# Patient Record
Sex: FEMALE | Race: AMERICAN INDIAN OR ALASKA NATIVE | NOT HISPANIC OR LATINO | Employment: FULL TIME | URBAN - METROPOLITAN AREA
[De-identification: names, ages, dates, MRNs, and addresses within clinical notes are randomized per-mention and may not be internally consistent; named-entity substitution may affect disease eponyms.]

---

## 2023-02-24 ENCOUNTER — TELEPHONE (OUTPATIENT)
Dept: PSYCHIATRY | Facility: CLINIC | Age: 48
End: 2023-02-24

## 2023-02-24 ENCOUNTER — TRANSFERRED RECORDS (OUTPATIENT)
Dept: HEALTH INFORMATION MANAGEMENT | Facility: CLINIC | Age: 48
End: 2023-02-24

## 2023-02-24 LAB
ALT SERPL-CCNC: 40 U/L (ref 5–33)
AST SERPL-CCNC: 58 U/L (ref 5–32)
CREATININE (EXTERNAL): 0.7 MG/DL (ref 0.5–0.9)
GFR ESTIMATED (EXTERNAL): 103.18 ML/MIN/1.73M2
GLUCOSE (EXTERNAL): 112 MG/DL (ref 74–109)
POTASSIUM (EXTERNAL): 4 MMOL/L (ref 3.5–5.1)
TSH SERPL-ACNC: 0.39 UIU/ML (ref 0.27–4.2)

## 2023-02-25 ENCOUNTER — HOSPITAL ENCOUNTER (INPATIENT)
Facility: HOSPITAL | Age: 48
LOS: 1 days | Discharge: HOME | End: 2023-02-26
Attending: NURSE PRACTITIONER | Admitting: STUDENT IN AN ORGANIZED HEALTH CARE EDUCATION/TRAINING PROGRAM
Payer: COMMERCIAL

## 2023-02-25 DIAGNOSIS — F33.1 MAJOR DEPRESSIVE DISORDER, RECURRENT EPISODE, MODERATE (H): Primary | ICD-10-CM

## 2023-02-25 LAB
FLUAV RNA SPEC QL NAA+PROBE: NEGATIVE
FLUBV RNA RESP QL NAA+PROBE: NEGATIVE
MAGNESIUM SERPL-MCNC: 1.5 MG/DL (ref 1.7–2.3)
PHOSPHATE SERPL-MCNC: 3.1 MG/DL (ref 2.5–4.5)
RSV RNA SPEC NAA+PROBE: NEGATIVE
SARS-COV-2 RNA RESP QL NAA+PROBE: NEGATIVE

## 2023-02-25 PROCEDURE — 36415 COLL VENOUS BLD VENIPUNCTURE: CPT | Performed by: NURSE PRACTITIONER

## 2023-02-25 PROCEDURE — 84100 ASSAY OF PHOSPHORUS: CPT | Performed by: NURSE PRACTITIONER

## 2023-02-25 PROCEDURE — 83735 ASSAY OF MAGNESIUM: CPT | Performed by: NURSE PRACTITIONER

## 2023-02-25 PROCEDURE — HZ2ZZZZ DETOXIFICATION SERVICES FOR SUBSTANCE ABUSE TREATMENT: ICD-10-PCS | Performed by: NURSE PRACTITIONER

## 2023-02-25 PROCEDURE — 87637 SARSCOV2&INF A&B&RSV AMP PRB: CPT | Performed by: NURSE PRACTITIONER

## 2023-02-25 PROCEDURE — 99222 1ST HOSP IP/OBS MODERATE 55: CPT | Performed by: NURSE PRACTITIONER

## 2023-02-25 PROCEDURE — 250N000013 HC RX MED GY IP 250 OP 250 PS 637: Performed by: STUDENT IN AN ORGANIZED HEALTH CARE EDUCATION/TRAINING PROGRAM

## 2023-02-25 PROCEDURE — 99223 1ST HOSP IP/OBS HIGH 75: CPT | Mod: AI | Performed by: STUDENT IN AN ORGANIZED HEALTH CARE EDUCATION/TRAINING PROGRAM

## 2023-02-25 PROCEDURE — 250N000013 HC RX MED GY IP 250 OP 250 PS 637: Performed by: NURSE PRACTITIONER

## 2023-02-25 PROCEDURE — 124N000001 HC R&B MH

## 2023-02-25 RX ORDER — ACETAMINOPHEN 325 MG/1
650 TABLET ORAL EVERY 4 HOURS PRN
Status: DISCONTINUED | OUTPATIENT
Start: 2023-02-25 | End: 2023-02-26 | Stop reason: HOSPADM

## 2023-02-25 RX ORDER — IBUPROFEN 600 MG/1
600 TABLET, FILM COATED ORAL 4 TIMES DAILY PRN
Status: DISCONTINUED | OUTPATIENT
Start: 2023-02-25 | End: 2023-02-26 | Stop reason: HOSPADM

## 2023-02-25 RX ORDER — CITALOPRAM HYDROBROMIDE 10 MG/1
10 TABLET ORAL DAILY
Status: ON HOLD | COMMUNITY
End: 2023-02-26

## 2023-02-25 RX ORDER — GABAPENTIN 600 MG/1
1200 TABLET ORAL ONCE
Status: COMPLETED | OUTPATIENT
Start: 2023-02-25 | End: 2023-02-25

## 2023-02-25 RX ORDER — GABAPENTIN 300 MG/1
300 CAPSULE ORAL EVERY 8 HOURS
Status: DISCONTINUED | OUTPATIENT
Start: 2023-03-02 | End: 2023-02-25

## 2023-02-25 RX ORDER — GABAPENTIN 300 MG/1
600 CAPSULE ORAL EVERY 8 HOURS
Status: DISCONTINUED | OUTPATIENT
Start: 2023-02-28 | End: 2023-02-25

## 2023-02-25 RX ORDER — CETIRIZINE HYDROCHLORIDE 10 MG/1
10 TABLET ORAL DAILY
COMMUNITY

## 2023-02-25 RX ORDER — CHLORHEXIDINE GLUCONATE 4 %
1 LIQUID (ML) TOPICAL
Status: ON HOLD | COMMUNITY
End: 2023-02-26

## 2023-02-25 RX ORDER — CALCIUM CARBONATE/VITAMIN D3 600 MG-10
1 TABLET ORAL 2 TIMES DAILY
Status: DISCONTINUED | OUTPATIENT
Start: 2023-02-25 | End: 2023-02-26 | Stop reason: HOSPADM

## 2023-02-25 RX ORDER — FLUMAZENIL 0.1 MG/ML
0.2 INJECTION, SOLUTION INTRAVENOUS
Status: DISCONTINUED | OUTPATIENT
Start: 2023-02-25 | End: 2023-02-26 | Stop reason: HOSPADM

## 2023-02-25 RX ORDER — OMEGA-3 FATTY ACIDS/FISH OIL 300-1000MG
600 CAPSULE ORAL 4 TIMES DAILY PRN
Status: ON HOLD | COMMUNITY
End: 2023-02-26

## 2023-02-25 RX ORDER — MAGNESIUM AMINO ACID CHELATE 27 MG
250 TABLET ORAL AT BEDTIME
Status: DISCONTINUED | OUTPATIENT
Start: 2023-02-25 | End: 2023-02-26 | Stop reason: HOSPADM

## 2023-02-25 RX ORDER — LANOLIN ALCOHOL/MO/W.PET/CERES
3 CREAM (GRAM) TOPICAL
Status: DISCONTINUED | OUTPATIENT
Start: 2023-02-25 | End: 2023-02-25

## 2023-02-25 RX ORDER — FAMOTIDINE 20 MG/1
20 TABLET, FILM COATED ORAL DAILY
COMMUNITY

## 2023-02-25 RX ORDER — MULTIPLE VITAMINS W/ MINERALS TAB 9MG-400MCG
1 TAB ORAL DAILY
Status: DISCONTINUED | OUTPATIENT
Start: 2023-02-25 | End: 2023-02-26 | Stop reason: HOSPADM

## 2023-02-25 RX ORDER — HYDROXYZINE HYDROCHLORIDE 25 MG/1
25 TABLET, FILM COATED ORAL EVERY 4 HOURS PRN
Status: DISCONTINUED | OUTPATIENT
Start: 2023-02-25 | End: 2023-02-26 | Stop reason: HOSPADM

## 2023-02-25 RX ORDER — CALCIUM CARBONATE/VITAMIN D3 600 MG-10
500 TABLET ORAL DAILY
Status: DISCONTINUED | OUTPATIENT
Start: 2023-02-25 | End: 2023-02-26 | Stop reason: HOSPADM

## 2023-02-25 RX ORDER — CITALOPRAM HYDROBROMIDE 20 MG/1
20 TABLET ORAL DAILY
Status: DISCONTINUED | OUTPATIENT
Start: 2023-02-25 | End: 2023-02-26 | Stop reason: HOSPADM

## 2023-02-25 RX ORDER — PYRIDOXINE HCL (VITAMIN B6) 25 MG
50 TABLET ORAL DAILY
Status: DISCONTINUED | OUTPATIENT
Start: 2023-02-25 | End: 2023-02-26 | Stop reason: HOSPADM

## 2023-02-25 RX ORDER — GABAPENTIN 100 MG/1
100 CAPSULE ORAL EVERY 8 HOURS
Status: DISCONTINUED | OUTPATIENT
Start: 2023-03-04 | End: 2023-02-25

## 2023-02-25 RX ORDER — HALOPERIDOL 5 MG/ML
2.5-5 INJECTION INTRAMUSCULAR EVERY 6 HOURS PRN
Status: DISCONTINUED | OUTPATIENT
Start: 2023-02-25 | End: 2023-02-26 | Stop reason: HOSPADM

## 2023-02-25 RX ORDER — OLANZAPINE 10 MG/2ML
10 INJECTION, POWDER, FOR SOLUTION INTRAMUSCULAR 3 TIMES DAILY PRN
Status: DISCONTINUED | OUTPATIENT
Start: 2023-02-25 | End: 2023-02-26 | Stop reason: HOSPADM

## 2023-02-25 RX ORDER — VITAMIN B COMPLEX
25 TABLET ORAL DAILY
Status: DISCONTINUED | OUTPATIENT
Start: 2023-02-25 | End: 2023-02-26 | Stop reason: HOSPADM

## 2023-02-25 RX ORDER — LISINOPRIL 30 MG/1
30 TABLET ORAL DAILY
COMMUNITY

## 2023-02-25 RX ORDER — MAGNESIUM HYDROXIDE/ALUMINUM HYDROXICE/SIMETHICONE 120; 1200; 1200 MG/30ML; MG/30ML; MG/30ML
30 SUSPENSION ORAL EVERY 4 HOURS PRN
Status: DISCONTINUED | OUTPATIENT
Start: 2023-02-25 | End: 2023-02-26 | Stop reason: HOSPADM

## 2023-02-25 RX ORDER — GABAPENTIN 300 MG/1
900 CAPSULE ORAL EVERY 8 HOURS
Status: DISCONTINUED | OUTPATIENT
Start: 2023-02-25 | End: 2023-02-25

## 2023-02-25 RX ORDER — OLANZAPINE 5 MG/1
5-10 TABLET, ORALLY DISINTEGRATING ORAL EVERY 6 HOURS PRN
Status: DISCONTINUED | OUTPATIENT
Start: 2023-02-25 | End: 2023-02-26 | Stop reason: HOSPADM

## 2023-02-25 RX ORDER — FLUTICASONE PROPIONATE 50 MCG
2 SPRAY, SUSPENSION (ML) NASAL DAILY
COMMUNITY

## 2023-02-25 RX ORDER — CETIRIZINE HYDROCHLORIDE 10 MG/1
10 TABLET ORAL DAILY
Status: DISCONTINUED | OUTPATIENT
Start: 2023-02-25 | End: 2023-02-26 | Stop reason: HOSPADM

## 2023-02-25 RX ORDER — HYDROXYZINE HYDROCHLORIDE 25 MG/1
25 TABLET, FILM COATED ORAL 4 TIMES DAILY PRN
COMMUNITY
Start: 2023-01-01

## 2023-02-25 RX ORDER — FAMOTIDINE 20 MG/1
20 TABLET, FILM COATED ORAL DAILY
Status: DISCONTINUED | OUTPATIENT
Start: 2023-02-25 | End: 2023-02-26 | Stop reason: HOSPADM

## 2023-02-25 RX ORDER — LORAZEPAM 2 MG/ML
1-2 INJECTION INTRAMUSCULAR EVERY 30 MIN PRN
Status: DISCONTINUED | OUTPATIENT
Start: 2023-02-25 | End: 2023-02-26 | Stop reason: HOSPADM

## 2023-02-25 RX ORDER — NALTREXONE HYDROCHLORIDE 50 MG/1
50 TABLET, FILM COATED ORAL DAILY
Status: ON HOLD | COMMUNITY
End: 2023-02-25

## 2023-02-25 RX ORDER — LORAZEPAM 1 MG/1
1-2 TABLET ORAL EVERY 30 MIN PRN
Status: DISCONTINUED | OUTPATIENT
Start: 2023-02-25 | End: 2023-02-26 | Stop reason: HOSPADM

## 2023-02-25 RX ORDER — MONTELUKAST SODIUM 10 MG/1
10 TABLET ORAL AT BEDTIME
Status: ON HOLD | COMMUNITY
End: 2023-02-26

## 2023-02-25 RX ORDER — TACROLIMUS 1 MG/G
OINTMENT TOPICAL 2 TIMES DAILY
Status: DISCONTINUED | OUTPATIENT
Start: 2023-02-25 | End: 2023-02-26 | Stop reason: HOSPADM

## 2023-02-25 RX ORDER — ALBUTEROL SULFATE 90 UG/1
1 AEROSOL, METERED RESPIRATORY (INHALATION) EVERY 4 HOURS PRN
Status: ON HOLD | COMMUNITY
End: 2023-02-25

## 2023-02-25 RX ORDER — VITAMIN B COMPLEX
25 TABLET ORAL DAILY
COMMUNITY

## 2023-02-25 RX ORDER — TACROLIMUS 1 MG/G
1 OINTMENT TOPICAL 2 TIMES DAILY
COMMUNITY
Start: 2022-09-15

## 2023-02-25 RX ORDER — FLUTICASONE PROPIONATE 50 MCG
2 SPRAY, SUSPENSION (ML) NASAL DAILY
Status: DISCONTINUED | OUTPATIENT
Start: 2023-02-25 | End: 2023-02-26 | Stop reason: HOSPADM

## 2023-02-25 RX ORDER — CLONIDINE HYDROCHLORIDE 0.1 MG/1
0.1 TABLET ORAL EVERY 8 HOURS
Status: DISCONTINUED | OUTPATIENT
Start: 2023-02-25 | End: 2023-02-25

## 2023-02-25 RX ORDER — CALCIUM CARBONATE/VITAMIN D3 600 MG-10
1 TABLET ORAL 2 TIMES DAILY
COMMUNITY

## 2023-02-25 RX ORDER — OLANZAPINE 10 MG/1
10 TABLET ORAL 3 TIMES DAILY PRN
Status: DISCONTINUED | OUTPATIENT
Start: 2023-02-25 | End: 2023-02-26 | Stop reason: HOSPADM

## 2023-02-25 RX ORDER — AMOXICILLIN 250 MG
1 CAPSULE ORAL 2 TIMES DAILY PRN
Status: DISCONTINUED | OUTPATIENT
Start: 2023-02-25 | End: 2023-02-26 | Stop reason: HOSPADM

## 2023-02-25 RX ORDER — FOLIC ACID 1 MG/1
1 TABLET ORAL DAILY
Status: DISCONTINUED | OUTPATIENT
Start: 2023-02-25 | End: 2023-02-26 | Stop reason: HOSPADM

## 2023-02-25 RX ADMIN — Medication 25 MCG: at 11:27

## 2023-02-25 RX ADMIN — Medication 100 MG: at 09:05

## 2023-02-25 RX ADMIN — MULTIPLE VITAMINS W/ MINERALS TAB 1 TABLET: TAB at 09:05

## 2023-02-25 RX ADMIN — FLUTICASONE PROPIONATE 2 SPRAY: 50 SPRAY, METERED NASAL at 11:26

## 2023-02-25 RX ADMIN — FAMOTIDINE 20 MG: 20 TABLET, FILM COATED ORAL at 11:27

## 2023-02-25 RX ADMIN — CLONIDINE HYDROCHLORIDE 0.1 MG: 0.1 TABLET ORAL at 09:05

## 2023-02-25 RX ADMIN — CLONIDINE HYDROCHLORIDE 0.1 MG: 0.1 TABLET ORAL at 00:46

## 2023-02-25 RX ADMIN — CETIRIZINE HYDROCHLORIDE 10 MG: 10 TABLET, FILM COATED ORAL at 11:27

## 2023-02-25 RX ADMIN — CITALOPRAM HYDROBROMIDE 20 MG: 20 TABLET ORAL at 11:27

## 2023-02-25 RX ADMIN — TACROLIMUS: 1 OINTMENT TOPICAL at 11:38

## 2023-02-25 RX ADMIN — CALCIUM CARBONATE 600 MG (1,500 MG)-VITAMIN D3 400 UNIT TABLET 1 TABLET: at 11:27

## 2023-02-25 RX ADMIN — Medication 250 MG: at 20:54

## 2023-02-25 RX ADMIN — CALCIUM CARBONATE 600 MG (1,500 MG)-VITAMIN D3 400 UNIT TABLET 1 TABLET: at 20:54

## 2023-02-25 RX ADMIN — HYDROXYZINE HYDROCHLORIDE 25 MG: 25 TABLET, FILM COATED ORAL at 20:53

## 2023-02-25 RX ADMIN — Medication 50 MG: at 11:26

## 2023-02-25 RX ADMIN — Medication 5 MG: at 00:46

## 2023-02-25 RX ADMIN — FOLIC ACID 1 MG: 1 TABLET ORAL at 09:05

## 2023-02-25 RX ADMIN — GABAPENTIN 1200 MG: 600 TABLET, FILM COATED ORAL at 00:46

## 2023-02-25 RX ADMIN — ACETAMINOPHEN 650 MG: 325 TABLET ORAL at 09:16

## 2023-02-25 RX ADMIN — Medication 500 MCG: at 11:27

## 2023-02-25 RX ADMIN — HYDROXYZINE HYDROCHLORIDE 25 MG: 25 TABLET, FILM COATED ORAL at 09:16

## 2023-02-25 RX ADMIN — GABAPENTIN 900 MG: 300 CAPSULE ORAL at 09:04

## 2023-02-25 RX ADMIN — Medication 5 MG: at 20:54

## 2023-02-25 ASSESSMENT — ACTIVITIES OF DAILY LIVING (ADL)
ADLS_ACUITY_SCORE: 28
DRESSING/BATHING_DIFFICULTY: NO
ADLS_ACUITY_SCORE: 28
HYGIENE/GROOMING: INDEPENDENT
WEAR_GLASSES_OR_BLIND: NO
CONCENTRATING,_REMEMBERING_OR_MAKING_DECISIONS_DIFFICULTY: NO
TOILETING_ISSUES: NO
FALL_HISTORY_WITHIN_LAST_SIX_MONTHS: NO
ADLS_ACUITY_SCORE: 28
LAUNDRY: UNABLE TO COMPLETE
WALKING_OR_CLIMBING_STAIRS_DIFFICULTY: NO
CHANGE_IN_FUNCTIONAL_STATUS_SINCE_ONSET_OF_CURRENT_ILLNESS/INJURY: NO
DRESS: SCRUBS (BEHAVIORAL HEALTH);INDEPENDENT
ADLS_ACUITY_SCORE: 28
ADLS_ACUITY_SCORE: 28
ORAL_HYGIENE: INDEPENDENT
DIFFICULTY_EATING/SWALLOWING: NO
ADLS_ACUITY_SCORE: 45
DOING_ERRANDS_INDEPENDENTLY_DIFFICULTY: NO
ADLS_ACUITY_SCORE: 28
ADLS_ACUITY_SCORE: 28

## 2023-02-25 NOTE — PLAN OF CARE
Problem: Adult Behavioral Health Plan of Care  Goal: Patient-Specific Goal (Individualization)  Description: Pt will follow recommendations of treatment team.  Pt will be compliant with medications.  Pt will attend 50 % of groups.  Pt will sleep 6-8 hours each night.  Outcome: Progressing   pt in her room at the start of the shift. Pt CIWA scored a 7 in the morning, pt states mostly due to anxiety. Pt given hydroxyzine 25mg at 0813. Pt complained of a sore throat, Covid swab done. Pt slept most of the shift. Pt rated her anxiety at 8/10, pain at 5/10. Pt denied SI, HI, hallucinations.   Problem: Suicide Risk  Goal: Absence of Self-Harm  Description: Pt will remain free from self harm.  Pt will verbalize 2-3 coping skills.   Outcome: Progressing   Goal Outcome Evaluation:    Plan of Care Reviewed With: patient          Face to face end of shift report communicated to evening shift RN. Reported that pt is a risk for suicide and is on the CIWA.     Jessica Cuadra, RN  2/25/2023  2:43 PM

## 2023-02-25 NOTE — PROGRESS NOTES
02/25/23 0047   Patient Belongings   Patient Belongings locker;sent to security per site process   Patient Belongings Put in Hospital Secure Location (Security or Locker, etc.) cash/credit card;clothing;shoes;wallet;cell phone/electronics   Belongings Search Yes   Clothing Search Yes   Comment pair of scrub socks, water canteen, phone  w/ plug, black bra, pink underwear, 2x black tanktops, jeans pants, grey sweater, black beanie, 1x pink sock, vape pen, sunglasses.     List items sent to safe: Airpods, black samsung phone w/ cracks, flower phone case wallet inside consisting of a simplicity card, VISA debit card, [place] ID Card, [place] Drivers Licence.    All other belongings put in assigned cubby in belongings room.       I have reviewed my belongings list on admission and verify that it is correct.     Patient signature_______________________________    Second staff witness (if patient unable to sign) ______________________________       I have received all my belongings at discharge.    Patient signature________________________________    [place]  2/25/2023  12:50 AM

## 2023-02-25 NOTE — H&P
"[facility] PSYCHIATRY   HISTORY AND PHYSICAL     ADMISSION DATA     Travis Arthur MRN# [id number]   Age: 47 year old YOB: 1975     Date of Admission: 2023  Primary Physician: No Ref-Primary, Physician        CHIEF COMPLAINT   \"SI.\"       HISTORY OF PRESENT ILLNESS     Per Nursing:    Pt has been drinking whiskey and fireball daily. Pt admits to history of alcohol withdrawal, denies any seizure hx. Pt brought to ER by her mother due to increased suicidal thoughts and alcohol use. Report indicated pt did try to jump out of the vehicle on the way to the ER. Pt denies SI at this time. Pt endorses wanting to get into inpatient CD treatment. Pt says she lives alone in an apartment. Pt says she has been out of work the last couple of months.     Per Patient:    The patient notes onset of depression and anxiety around December. Records shows she has a prior history of depression prior to this. She notes that she thinks her drinking might be related to this. She describes depressed, anhedonia, hard to work, some change in sleeping, and random suicidal ideation, but not considerable or with a plan. She notes that she started Celexa which is helping a little bit. She notes that she has some social anxiety that has been going on since her depression worsened.    The patient notes that she originally wanted to go to the hospital for help and then she changed her mind and tried to exit the car. She notes that she did not try to end her life. She notes that she was scared about going to the hospital.    The patient notes that she has been under considerable stress at her job. She notes that her work calls her \"the  lady.\" She notes that she has had to deal with considerable loss because of this job. She notes that her job is stressful also. She denies symptoms consistent with PTSD.    The patient notes that her last drink was yesterday. She denies any symptoms of withdrawal.    The patient denies " any headache, confusion, change in vision, chest pain, SOB, abdominal pain, diarrhea, or constipation. No medical concerns today.    She consents to increasing Celexa after discussing B/R/SE, including headache, GI side effects, and sexual side effects.        PSYCHIATRIC HISTORY     No prior hospitalizations. New onset of depression/anxiety in December per her description but report of depression earlier. History of SI, being passive and transient. No history of suicide attempts. Primary care is managing psychotropics. Therapist is Natalia Hadley through [facility]. Medication trials include Celexa (current) and Lexapro (didn't take consistently and felt weird on it). Started Celexa on January 4th. She notes that it is helping some.       SUBSTANCE USE HISTORY   History   Drug Use Not on file       Social History    Substance and Sexual Activity      Alcohol use: Not on file      History   Smoking Status     Not on file   Smokeless Tobacco     Not on file     EtOH: Daily drinking since Sunday - a pint or more of Whisky. Engages in binge drinking with periods of not drinking. Longest period of not drinking being 1-2 months. History of WD in the past. No complicated WD history. Started drinking consistently during COVID.  Tobacco: Daily use of 5 cigarettes and then vaping some a day. Many years of use. She feels like it would be hard to quit.    No history of CD treatment.       SOCIAL HISTORY   Social History     Socioeconomic History     Marital status: Single     Spouse name: Not on file     Number of children: Not on file     Years of education: Not on file     Highest education level: Not on file   Occupational History     Not on file   Tobacco Use     Smoking status: Not on file     Smokeless tobacco: Not on file   Substance and Sexual Activity     Alcohol use: Not on file     Drug use: Not on file     Sexual activity: Not on file   Other Topics Concern     Not on file   Social History  Narrative     Not on file     Social Determinants of Health     Financial Resource Strain: Not on file   Food Insecurity: Not on file   Transportation Needs: Not on file   Physical Activity: Not on file   Stress: Not on file   Social Connections: Not on file   Intimate Partner Violence: Not on file   Housing Stability: Not on file     Lives in [place]. Single. Lives alone in an apartment. No history of being . No children. Unemployed for a couple of month due to a leave of absence due to medical reasons, namely stress, depression, and anxiety. Is a  through [facility]. Financial stressors. Family in [place].       FAMILY HISTORY   No family history on file.     Possible alcoholism in parents.     PAST MEDICAL HISTORY   No past medical history on file.    No past surgical history on file.    Seasonal allergies     MEDICATIONS   Prior to Admission medications    Medication Sig Start Date End Date Taking? Authorizing Provider   calcium carbonate-vitamin D (CALTRATE) 600-10 MG-MCG per tablet Take 1 tablet by mouth 2 times daily   Yes Reported, Patient   famotidine (PEPCID) 20 MG tablet Take 20 mg by mouth daily   Yes Reported, Patient   lisinopril (ZESTRIL) 30 MG tablet Take 30 mg by mouth daily   Yes Reported, Patient   tacrolimus (PROTOPIC) 0.1 % external ointment Apply 1 Application. topically 2 times daily 9/15/22  Yes Reported, Patient   Vitamin D3 (CHOLECALCIFEROL) 25 mcg (1000 units) tablet Take 25 mcg by mouth daily   Yes Reported, Patient   cetirizine (ZYRTEC) 10 MG tablet Take 10 mg by mouth daily    Reported, Patient   citalopram (CELEXA) 10 MG tablet Take 10 mg by mouth daily    Reported, Patient   fluticasone (FLONASE) 50 MCG/ACT nasal spray Spray 2 sprays in nostril daily    Reported, Patient   hydrOXYzine (ATARAX) 25 MG tablet Take 25 mg by mouth 4 times daily as needed 1/1/23   Reported, Patient   ibuprofen (ADVIL/MOTRIN) 200 MG capsule Take 600 mg by mouth 4 times daily as needed  "   Reported, Patient   Melatonin 12 MG TABS Take 1 tablet by mouth nightly as needed    Reported, Patient   montelukast (SINGULAIR) 10 MG tablet Take 10 mg by mouth At Bedtime    Reported, Patient   vitamin B-12 (CYANOCOBALAMIN) 500 MCG tablet Take 500 mcg by mouth daily    Reported, Patient        PHYSICAL EXAM/ROS     General: Awake and alert, NAD  HEENT: EOMI, no scleral icterus, no injection of conjunctivae, moist mucus membranes  Respiratory: Breathing comfortably   Extremities: No cyanosis, clubbing, or edema   Skin: No gross rash, no bruising  Neuro: CN II-XII intact, no focal deficits        LABS   No results found for this or any previous visit (from the past 24 hour(s)).      MENTAL STATUS EXAM   Vitals: /63 (BP Location: Right arm)   Pulse 73   Temp (!) 95.7  F (35.4  C) (Temporal)   Resp 20   Ht 1.702 m (5' 7\")   Wt 93.3 kg (205 lb 11.2 oz)   SpO2 98%   BMI 32.22 kg/m      Appearance: Alert, oriented, dressed in hospital scrubs  Attitude: Cooperative   Eye Contact: Fair  Mood: \"Down\"  Affect: Restricted range of affect, mood congruent  Speech: Normal range. Normal rhythm   Psychomotor Behavior: No tremor, rigidity, akathisia, or psychomotor retardation    Thought Process: Logical, goal directed   Associations: No loose associations   Thought Content: Denies SI. No SIB. Denies AVH. No evidence of delusional thought  Insight: Good  Judgment: Fair  Oriented to: Person, place, and time  Attention Span and Concentration: Intact  Recent and Remote Memory: Intact  Language: English with appropriate syntax and vocabulary  Fund of Knowledge: Average  Muscle Strength and Tone: Grossly normal  Gait and Station: Grossly normal       ASSESSMENT     This is a 47 year old female with a PMH of MDD who presents with SI and worsening depression and anxiety occurring in the context of work stressors and increased substance use. This is the patients first hospitalization with her having a history of depression " in the past but not as severe as this episode. Suspect large contribution from substance use. She would benefit from brief hospitalization for crisis stabilization.    In terms of treatment, increasing Celexa to further address depression and anxiety. In addition, recommend outpatient CD treatment with patient already in the process of arranging.       DIAGNOSIS     1. Major depressive disorder, recurrent, moderate in severity with anxious distress  2. Alcohol use disorder, severe  3. Suicidal ideation        PLAN     Location: Unit 5  Legal Status: Orders Placed This Encounter      Emergency Hospitalization Hold (72 Hr Hold)    Safety Assessment:    Behavioral Orders   Procedures     Code 1 - Restrict to Unit     Routine Programming     As clinically indicated     Status 15     Every 15 minutes.      PTA psychotropic medications held:     -None    PTA psychotropic medications continued/changed:     - Celexa 10 mg daily increased to 20 mg daily   - Hydroxyzine prn   - Melatonin prn reduced from 12 to 5 mg prn     New psychotropic medications initiated:     -Magnesium gluconate 250 mg at bedtime for sleep and anxiety  -Standard unit prn agents, including Zyprexa prn agitation    Programming: Patient will be treated in a therapeutic milieu with appropriate individual and group therapies. Education will be provided on diagnoses, medications, and treatments.     Medical diagnoses:  Per medicine    #. RA  - Continue home regimen.  - Last injection of Cimzia every 4 weeks 6 weeks ago. Patient to arrange outpatient    #. Allergies  - Did not resume Singulair due to patient preference    #. Alcohol withdrawal  - [facility] protocol  - Ativan prn  - Thiamine 100 mg daily   - Multivitamin daily  - Folic acid 1 mg daily     Consult: None  Tests: None    Anticipated LOS: 3-5 days   Disposition: Home with outpatient services (CD treatment). Already has medication management and therapy.    Justification for hospitalization: reasons  for hospitalization include potential safety risk to self or others within the last week, decreased functioning in outpatient setting and in the setting of no outpatient management, need for highly structured inpatient management for stabilization of psychiatric symptoms, need for psychiatric medication initiation and stabilization.       ATTESTATION      Dr. Raúl Chicas  Psychiatrist     VIDEO VISIT    Patient has given verbal consent for video visit?: Yes     Video- Visit Details  Type of service:  video visit for mental health treatment.  Time of service:    Date:  02/25/2023    Video Start Time: 930 AM      Video End Time: 1025AM    Reason for video visit: COVID-19 [place]  Originating Site (patient location):  [facility]  Distant Site (provider location):  Remote location  Mode of Communication:  Video Conference via [employer]

## 2023-02-25 NOTE — TELEPHONE ENCOUNTER
S: Outside Facility [facility] [place] [place], Charge GUERITA Goel calling at [phone].  47 year old/Female presenting with Alcohol Intoxification and Suicidal Ideation    B: Pt arrived via mother. Pt presents with suicidal ideation and alcohol intoxication.  Pt affect in ED: Intoxicated  Pt Dx: Major Depressive Disorder  Previous IPMH hx? No  Pt endorses SI. Pt denies SIB.   Pt denies HI. Pt denies hallucinations.   Hx of suicide attempt? No  Hx of aggression, or current concerns for aggression this visit? No  Pt is prescribed medication. Pt is not medication compliant  Pt endorses OP services: [facility]  CD concerns: Etoh; positive for Methadone  Acute medical concerns: n/a  Does Pt present with any of the following: assistive devices, insulin pump, J/G tube, catheter, CPAP, continuous IV, continuous O2, bariatric needs, ADA needs? No  Is Pt their own guardian? Yes:   Pt is ambulatory  Pt is  able to perform ADLs independently    A: Pt meets criteria for review for IP admission. Patient on a 72-hour hold.   COVID: Negative  Utox: Positive for THC and Methadone  CMP: Abnormalities: AST 58; ALT 40  CBC: WNL  HCG: Negative    R: Patient accepted for behavioral bed placement: Yes        Accepted by Provider April Cid    Admission to Inpatient Level of Care is indicated due to:     1. Patient risk of severity of behavioral health disorder is appropriate to proposed level of care as indicated by:   a. Imminent risk of harm to self Yes describe   b. Imminent risk of harm to others No describe   And/or  Behavioral health disorder is present and appropriate for inpatient care with both of the following:   a.  Severe psychiatric, behavioral or other comorbid conditions: Yes describe   b.  Severe dysfunction in daily living is present: Yes describe recent job loss r/t ETOH  2.  Inpatient mental health services are necessary to meet patient needs based on:   a. Specific condition related to admission diagnosis is  present and will likely improve with treatment at an inpatient level of care: Yes  b. Specific condition related to admission diagnosis will likely deteriorate in the absence of treatment at an inpatient level of care: Yes    3.  Situation and expectations are appropriate for inpatient care, as indicated by  one of the following:     A. Patient is unwilling to participate in treatment voluntarily and requires treatment. Yes   B. Is voluntary treatment at lower level of care feasible No  C. Around the clock medical and nursing care is for symptoms is required: Yes  D. Patient management at lower level of care is not appropriate and  biopsychosocial stressors may be contributing to clinical presentation. Yes

## 2023-02-25 NOTE — PLAN OF CARE
Face to face shift report received from Rosey RN. Rounding completed, pt observed laying in bed at the start of the shift.    Patient withdrawn to room sleeping majority of the evening. Alert and making needs known. Ate 100% of dinner. Pleasant during conversation with this writer. Compliant with scheduled medications and nursing assessment. Reports anxiety and received hydroxyzine 25 mg at 2053 with other medications. Denies depression, hallucinations, SI/HI, and pain. Patient states she hasn't been feeling well and reports minimal body aches. Covid swab resulted negative. Requested and received melatonin 5 mg at 2053. Scored 0 on the CIWA at 1800 while sleeping appearing comfortable. Scored a 3 at 2200 due to anxiety.     Problem: Adult Behavioral Health Plan of Care  Goal: Patient-Specific Goal (Individualization)  Description: Pt will follow recommendations of treatment team.  Pt will be compliant with medications.  Pt will attend 50 % of groups.  Pt will sleep 6-8 hours each night.  Outcome: Progressing     Problem: Suicide Risk  Goal: Absence of Self-Harm  Description: Pt will remain free from self harm.  Pt will verbalize 2-3 coping skills.   Outcome: Progressing    Face to face report will be communicated to oncoming RN.    Shalini Gomez RN  2/25/2023

## 2023-02-25 NOTE — PLAN OF CARE
"  Problem: Adult Behavioral Health Plan of Care  Goal: Patient-Specific Goal (Individualization)  Description: Pt will follow recommendations of treatment team.  Pt will be compliant with medications.  Pt will attend 50 % of groups.  Pt will sleep 6-8 hours each night.  Outcome: Progressing     Problem: Suicide Risk  Goal: Absence of Self-Harm  Description: Pt will remain free from self harm.  Pt will verbalize 2-3 coping skills.   Outcome: Progressing     ADMISSION NOTE    Reason for admission ETOH abuse and SI.  Safety concerns none noted.  Risk for or history of violence none noted.  Skin- multiple tattoos, no areas of concern.     Patient arrived on unit from [facility] accompanied by transport and security on 2/25/2023  00:08 AM.   Status on arrival: Pleasant and cooperative  /97 (BP Location: Right arm)   Pulse 98   Temp 98.6  F (37  C) (Temporal)   Resp 18   Ht 1.702 m (5' 7\")   Wt 93.3 kg (205 lb 11.2 oz)   SpO2 97%   BMI 32.22 kg/m    Patient given tour of unit and Welcome to  unit papers given to patient, wanding completed, belongings inventoried, and admission assessment completed.   Patient's legal status on arrival is 72 hour hold. Appropriate legal rights discussed with and copy given to patient. Patient Bill of Rights discussed with and copy given to patient.   Patient denies SI, HI, and thoughts of self harm and contracts for safety while on unit.      Pt has been drinking whiskey and fireball daily. Pt admits to history of alcohol withdrawal, denies any seizure hx. Pt brought to ER by her mother due to increased suicidal thoughts and alcohol use. Report indicated pt did try to jump out of the vehicle on the way to the ER. Pt denies SI at this time. Pt endorses wanting to get into inpatient CD treatment. Pt says she lives alone in an apartment. Pt says she has been out of work the last couple of months.     Pt appeared to sleep after 0115 rounds. Pt did not have any noted episodes of " self harm this shift.    Face to face report will be communicated to oncoming RN.    Manasa Marie RN  2/25/2023  5:59 AM

## 2023-02-25 NOTE — H&P
[facility]    History and Physical  Medical Services       Date of Admission:  2/25/2023  Date of Service (when I saw the patient): 02/25/23    Assessment & Plan     Active Medical Problems:  Hypertension- vitals stable, denies chest pain, sob. Home dose of lisinopril ordered.     Seasonal allergies- home dose of zyrtec and Flonase ordered.    GERD- home dose of Pepcid ordered. Denies GERD symptoms.     Rheumatoid arthritis- pt reports she takes a monthly injection Cimzia. She reports its been 6 weeks since she had it. She does them herself and the injections get sent directly to her house. She reports she is going to take her injection once discharged. She takes ibuprofen as needed. Denies any pain at this time. Tylenol and ibuprofen as needed.     Rule out covid- complaining of sore throat, nonproductive cough, headache times a couple days. Afebrile. Vitals stable. Denies chest pain, sob. Back of throat- no erythema no exudate, tonsils normal. No strep swab indicated at this time. Chloraseptic throat spray, sore throat lozenge ordered. Tylenol as needed.         Code Status: Full Code    Carole Goetz CNP    Primary Care Physician   Physician No Ref-Primary    Chief Complaint   Psych evaluation     History is obtained from the patient and medical chart     History of Present Illness   Travis Arthur is a 47 year old female who presents to [facility] via her mother. She is presenting with Alcohol Intoxification and Suicidal Ideation. Past history of major depressive disorder.        Past Medical History    I have reviewed this patient's medical history and updated it with pertinent information if needed.   No past medical history on file.    Past Surgical History   I have reviewed this patient's surgical history and updated it with pertinent information if needed.  No past surgical history on file.    Prior to Admission Medications   Prior to Admission Medications   Prescriptions  Last Dose Informant Patient Reported? Taking?   Melatonin 12 MG TABS   Yes No   Sig: Take 1 tablet by mouth nightly as needed   Vitamin D3 (CHOLECALCIFEROL) 25 mcg (1000 units) tablet   Yes Yes   Sig: Take 25 mcg by mouth daily   calcium carbonate-vitamin D (CALTRATE) 600-10 MG-MCG per tablet   Yes Yes   Sig: Take 1 tablet by mouth 2 times daily   cetirizine (ZYRTEC) 10 MG tablet   Yes No   Sig: Take 10 mg by mouth daily   citalopram (CELEXA) 10 MG tablet   Yes No   Sig: Take 10 mg by mouth daily   famotidine (PEPCID) 20 MG tablet   Yes Yes   Sig: Take 20 mg by mouth daily   fluticasone (FLONASE) 50 MCG/ACT nasal spray   Yes No   Sig: Spray 2 sprays in nostril daily   hydrOXYzine (ATARAX) 25 MG tablet   Yes No   Sig: Take 25 mg by mouth 4 times daily as needed   ibuprofen (ADVIL/MOTRIN) 200 MG capsule   Yes No   Sig: Take 600 mg by mouth 4 times daily as needed   lisinopril (ZESTRIL) 30 MG tablet   Yes Yes   Sig: Take 30 mg by mouth daily   montelukast (SINGULAIR) 10 MG tablet   Yes No   Sig: Take 10 mg by mouth At Bedtime   tacrolimus (PROTOPIC) 0.1 % external ointment   Yes Yes   Sig: Apply 1 Application. topically 2 times daily   vitamin B-12 (CYANOCOBALAMIN) 500 MCG tablet   Yes No   Sig: Take 500 mcg by mouth daily      Facility-Administered Medications: None     Allergies   Allergies   Allergen Reactions     Seasonal Allergies Unknown       Social History   I have reviewed this patient's social history and updated it with pertinent information if needed. Travis Arthur      Family History   I have reviewed this patient's family history and updated it with pertinent information if needed.   No family history on file.    Review of Systems   CONSTITUTIONAL:  negative  EYES:  negative  HEENT:  Negative except sore throat, nonproductive cough, headache  RESPIRATORY:  negative  CARDIOVASCULAR:  negative  GASTROINTESTINAL:  negative  GENITOURINARY:  negative  INTEGUMENT/BREAST:  negative  HEMATOLOGIC/LYMPHATIC:   negative  ALLERGIC/IMMUNOLOGIC:  negative  ENDOCRINE:  negative  MUSCULOSKELETAL:  Negative except chronic generalized pain- RA  NEUROLOGICAL:  negative    Physical Exam   Temp: 98.1  F (36.7  C) Temp src: Tympanic BP: 120/63 Pulse: 73   Resp: 20 SpO2: 98 % O2 Device: None (Room air)    Vital Signs with Ranges  Temp:  [95.7  F (35.4  C)-98.6  F (37  C)] 98.1  F (36.7  C)  Pulse:  [73-98] 73  Resp:  [18-20] 20  BP: (120-161)/(63-97) 120/63  SpO2:  [97 %-98 %] 98 %  205 lbs 3.27 oz    Constitutional: awake, alert, cooperative, no apparent distress, and appears stated age, vitals stable   Eyes: Lids and lashes normal, pupils equal, round and reactive to light, extra ocular muscles intact, sclera clear, conjunctiva normal  ENT: Normocephalic, without obvious abnormality, atraumatic, external ears without lesions, oral pharynx with moist mucous membranes, no erythema or exudates  Hematologic / Lymphatic: no cervical lymphadenopathy  Respiratory: No increased work of breathing, good air exchange, clear to auscultation bilaterally, no crackles or wheezing  Cardiovascular: Normal apical impulse, regular rate and rhythm, normal S1 and S2, no S3 or S4, and no murmur noted  GI:  normal bowel sounds, soft, non-distended, non-tender, no masses palpated, no hepatosplenomegally  Genitounirinary: deferred  Skin: normal skin color, texture, turgor and no redness, warmth, or swelling  Musculoskeletal: There is no redness, warmth, or swelling of the joints.  Full range of motion noted.    Neurologic: Awake, alert, oriented to name, place and time.   Neuropsychiatric: General: normal, calm and normal eye contact    Data   Data reviewed today:   No lab results found in last 7 days.    No results found for this or any previous visit (from the past 24 hour(s)).

## 2023-02-26 VITALS
TEMPERATURE: 98.6 F | HEART RATE: 88 BPM | HEIGHT: 67 IN | SYSTOLIC BLOOD PRESSURE: 120 MMHG | BODY MASS INDEX: 32.21 KG/M2 | RESPIRATION RATE: 14 BRPM | OXYGEN SATURATION: 98 % | WEIGHT: 205.2 LBS | DIASTOLIC BLOOD PRESSURE: 67 MMHG

## 2023-02-26 PROCEDURE — 250N000013 HC RX MED GY IP 250 OP 250 PS 637: Performed by: NURSE PRACTITIONER

## 2023-02-26 PROCEDURE — 99239 HOSP IP/OBS DSCHRG MGMT >30: CPT | Mod: 95 | Performed by: STUDENT IN AN ORGANIZED HEALTH CARE EDUCATION/TRAINING PROGRAM

## 2023-02-26 PROCEDURE — 250N000013 HC RX MED GY IP 250 OP 250 PS 637: Performed by: STUDENT IN AN ORGANIZED HEALTH CARE EDUCATION/TRAINING PROGRAM

## 2023-02-26 RX ORDER — CITALOPRAM HYDROBROMIDE 20 MG/1
20 TABLET ORAL DAILY
Qty: 30 TABLET | Refills: 1 | DISCHARGE
Start: 2023-02-26

## 2023-02-26 RX ORDER — ONDANSETRON 4 MG/1
4 TABLET, FILM COATED ORAL EVERY 6 HOURS PRN
Status: DISCONTINUED | OUTPATIENT
Start: 2023-02-26 | End: 2023-02-26 | Stop reason: HOSPADM

## 2023-02-26 RX ADMIN — Medication 500 MCG: at 09:41

## 2023-02-26 RX ADMIN — MULTIPLE VITAMINS W/ MINERALS TAB 1 TABLET: TAB at 09:41

## 2023-02-26 RX ADMIN — FLUTICASONE PROPIONATE 2 SPRAY: 50 SPRAY, METERED NASAL at 09:41

## 2023-02-26 RX ADMIN — IBUPROFEN 600 MG: 600 TABLET ORAL at 09:41

## 2023-02-26 RX ADMIN — LISINOPRIL 30 MG: 10 TABLET ORAL at 09:42

## 2023-02-26 RX ADMIN — FOLIC ACID 1 MG: 1 TABLET ORAL at 09:42

## 2023-02-26 RX ADMIN — Medication 100 MG: at 09:42

## 2023-02-26 RX ADMIN — CITALOPRAM HYDROBROMIDE 20 MG: 20 TABLET ORAL at 09:42

## 2023-02-26 RX ADMIN — FAMOTIDINE 20 MG: 20 TABLET, FILM COATED ORAL at 09:42

## 2023-02-26 RX ADMIN — CALCIUM CARBONATE 600 MG (1,500 MG)-VITAMIN D3 400 UNIT TABLET 1 TABLET: at 09:41

## 2023-02-26 RX ADMIN — Medication 25 MCG: at 09:42

## 2023-02-26 RX ADMIN — Medication 50 MG: at 09:42

## 2023-02-26 RX ADMIN — CETIRIZINE HYDROCHLORIDE 10 MG: 10 TABLET, FILM COATED ORAL at 09:42

## 2023-02-26 ASSESSMENT — ACTIVITIES OF DAILY LIVING (ADL)
ADLS_ACUITY_SCORE: 28

## 2023-02-26 NOTE — PLAN OF CARE
Problem: Adult Behavioral Health Plan of Care  Goal: Patient-Specific Goal (Individualization)  Description: Pt will follow recommendations of treatment team.  Pt will be compliant with medications.  Pt will attend 50 % of groups.  Pt will sleep 6-8 hours each night.  Outcome: Progressing     Problem: Suicide Risk  Goal: Absence of Self-Harm  Description: Pt will remain free from self harm.  Pt will verbalize 2-3 coping skills.   Outcome: Progressing     Face to face shift report received from Shalini DEXTER. Rounding completed, pt observed.     Pt appeared to sleep most of this shift. Pt did not have any noted episodes of self harm this shift.    Face to face report will be communicated to oncoming RN.    Manasa Marie RN  2/26/2023  5:47 AM

## 2023-02-26 NOTE — PLAN OF CARE
Problem: Adult Behavioral Health Plan of Care  Goal: Patient-Specific Goal (Individualization)  Description: Pt will follow recommendations of treatment team.  Pt will be compliant with medications.  Pt will attend 50 % of groups.  Pt will sleep 6-8 hours each night.  Outcome: Progressing    Pt in bed at the start of the shift. Pt denies any symptom of withdrawal other than anxiety. Pt reports a back ache rated 6/10 for pain. Pt given ibuprofen. Pt met with provider and will discharge this afternoon.          Problem: Suicide Risk  Goal: Absence of Self-Harm  Description: Pt will remain free from self harm.  Pt will verbalize 2-3 coping skills.   Outcome: Progressing   Goal Outcome Evaluation:    Plan of Care Reviewed With: patient         Discharge Note    Patient Discharged to home on 2/26/2023 1:02 PM via Private Car accompanied by [facility] staff.     Patient informed of discharge instructions in AVS. patient verbalizes understanding and denies having any questions pertaining to AVS. Patient stable at time of discharge. Patient denies SI, HI, and thoughts of self harm at time of discharge. All personal belongings returned to patient. Discharge prescriptions not sent at this time. AVS and discharge summary sent home with pt.   Jessica Cuadra, RN  2/26/2023  1:04 PM

## 2023-02-26 NOTE — PLAN OF CARE
Problem: Adult Behavioral Health Plan of Care  Goal: Plan of Care Review  Outcome: Adequate for Care Transition  Flowsheets (Taken 2/26/2023 1200)  Patient Agreement with Plan of Care: agrees     Problem: Adult Behavioral Health Plan of Care  Goal: Patient-Specific Goal (Individualization)  Description: Pt will follow recommendations of treatment team.  Pt will be compliant with medications.  Pt will attend 50 % of groups.  Pt will sleep 6-8 hours each night.  2/26/2023 1307 by Jessica Cuadra, RN  Outcome: Adequate for Care Transition     Problem: Suicide Risk  Goal: Absence of Self-Harm  Description: Pt will remain free from self harm.  Pt will verbalize 2-3 coping skills.   2/26/2023 1307 by Jessica Cuadra, RN  Outcome: Adequate for Care Transition   Goal Outcome Evaluation:    Plan of Care Reviewed With: patient

## 2023-02-26 NOTE — DISCHARGE SUMMARY
[facility] PSYCHIATRY  DISCHARGE SUMMARY     DISCHARGE DATA     Travis Arthur MRN# [id number]   Age: 47 year old YOB: 1975     Date of Admission: 2/25/2023  Date of Discharge: 2/26/2023  Discharge Provider: Raúl Chicas DO       REASON FOR ADMISSION     This is a 47 year old female with a PMH of MDD who presents with SI and worsening depression and anxiety occurring in the context of work stressors and increased substance use. This is the patients first hospitalization with her having a history of depression in the past but not as severe as this episode. Suspect large contribution from substance use. She would benefit from brief hospitalization for crisis stabilization.       DISCHARGE DIAGNOSES     1. Major depressive disorder, recurrent, moderate in severity with anxious distress  2. Alcohol use disorder, severe  3. Suicidal ideation        CONSULTS     None       HOSPITAL COURSE   Psychiatric Course:    Legal status: Orders Placed This Encounter      Emergency Hospitalization Hold (72 Hr Hold)    Patient was admitted to unit 5 due to the aforementioned presentation. The patient was placed under 15 minute checks to ensure patient safety. The patient participated in unit programming and groups as able.    Ms. Arthur did not require seclusion/restraint during hospitalization.     We reviewed with Ms. Arthur current and past medication trials including duration, dose, response and side effects. During this hospitalization, the following changes to the patient's psychotropic medications were made:    PTA psychotropic medications held:      -None     PTA psychotropic medications continued/changed:      - Celexa 10 mg daily increased to 20 mg daily   - Hydroxyzine prn   - Melatonin prn reduced from 12 to 5 mg prn      New psychotropic medications initiated:      -Magnesium gluconate 250 mg at bedtime for sleep and anxiety    With these changes and supports the patient noticed improvement in their  symptoms and felt sufficiently ready for discharge. The patient noted that her SI resolved and that her depression improved. She tolerated the increase in Celexa well. She noted her family was comfortable with her returning home. She planned to start CD treatment with her mother help scheduling.    As a result, Travis Arthur was discharged. At the time of discharge, Travis Arthur was determined to not be a danger to self or others. At the current time of discharge, the patient does not meet criteria for involuntary hospitalization. On the day of discharge, the patient reports that they do not have suicidal or homicidal ideation. Steps taken to minimize risk include: assessing patient s behavior and thought process daily during hospital stay, discharging patient with adequate plan for follow up for mental and physical health and discussing safety plan of returning to the hospital should the patient ever have thoughts of harming themselves or others. Therefore, based on all available evidence including the factors cited above, the patient does not appear to be at imminent risk for self-harm, and is appropriate for outpatient level of care. However, if patient uses substances or is medication non-adherent, their risk of decompensation and SI will be elevated. This was discussed with the patient.    Medical Course:    The patient was medically cleared for admission to inpatient psychiatry. The following medical issues arose below. The patient was medically stable at the time of discharge.     #. RA  - Continue home regimen.  - Last injection of Cimzia every 4 weeks 6 weeks ago. Patient to arrange outpatient     #. Allergies  - Did not resume Singulair due to patient preference     #. Alcohol withdrawal, resolved  - [facility] protocol  - Ativan prn  - Thiamine 100 mg daily   - Multivitamin daily  - Folic acid 1 mg daily         DISCHARGE MEDICATIONS     Current Discharge Medication List      CONTINUE these medications which  "have CHANGED    Details   citalopram (CELEXA) 20 MG tablet Take 1 tablet (20 mg) by mouth daily  Qty: 30 tablet, Refills: 1    Associated Diagnoses: Major depressive disorder, recurrent episode, moderate (H)      melatonin 5 MG tablet Take 1 tablet (5 mg) by mouth every evening as needed for sleep    Associated Diagnoses: Major depressive disorder, recurrent episode, moderate (H)         CONTINUE these medications which have NOT CHANGED    Details   calcium carbonate-vitamin D (CALTRATE) 600-10 MG-MCG per tablet Take 1 tablet by mouth 2 times daily      famotidine (PEPCID) 20 MG tablet Take 20 mg by mouth daily      lisinopril (ZESTRIL) 30 MG tablet Take 30 mg by mouth daily      tacrolimus (PROTOPIC) 0.1 % external ointment Apply 1 Application. topically 2 times daily      Vitamin D3 (CHOLECALCIFEROL) 25 mcg (1000 units) tablet Take 25 mcg by mouth daily      cetirizine (ZYRTEC) 10 MG tablet Take 10 mg by mouth daily      fluticasone (FLONASE) 50 MCG/ACT nasal spray Spray 2 sprays in nostril daily      hydrOXYzine (ATARAX) 25 MG tablet Take 25 mg by mouth 4 times daily as needed      vitamin B-12 (CYANOCOBALAMIN) 500 MCG tablet Take 500 mcg by mouth daily         STOP taking these medications       ibuprofen (ADVIL/MOTRIN) 200 MG capsule Comments:   Reason for Stopping:         montelukast (SINGULAIR) 10 MG tablet Comments:   Reason for Stopping:                MENTAL STATUS EXAM   Vitals: /66 (BP Location: Right arm)   Pulse 73   Temp 98.4  F (36.9  C) (Temporal)   Resp 14   Ht 1.702 m (5' 7\")   Wt 93.1 kg (205 lb 3.3 oz)   SpO2 98%   BMI 32.14 kg/m      Appearance: Alert, oriented, dressed in hospital scrubs, appears stated age   Attitude: Cooperative   Eye Contact: Good  Mood: \"Better\"  Affect: Full range of affect  Speech: Normal rate and rhythm   Psychomotor Behavior: No tremor, rigidity, or psychomotor abnormality   Thought Process: Logical, goal directed   Associations: No loose associations "   Thought Content: Denies SI or plan. No SIB. Denies A/V hallucinations. No evidence of delusional thought.  Insight: Good  Judgment: Good  Oriented to: Person, place, and time  Attention Span and Concentration: Intact  Recent and Remote Memory: Intact  Language: English with appropriate syntax and vocabulary  Fund of Knowledge: Average  Muscle Strength and Tone: Grossly normal  Gait and Station: Grossly normal       DISCHARGE PLAN     1.  Education given regarding diagnostic and treatment options with risks, benefits and alternatives with adequate verbalization of understanding.  2.  Discharge to home. Upon detailed review of risk factors, patient amenable for release.   3.  Continue aforementioned medications and associated medication changes with follow-up by outpatient provider.  4.  Crisis management planning in place.    5.  Nursing and  to review further discharge recommendations.   6.  Patient is being discharged with the following appointments:    Patient to reschedule her medication and therapy appointment on her own. In addition, already had Rule 25 and she is in process of arranging CD treatment. She will schedule this on her own.    7. General discharge instructions:       Reason for your hospital stay    SI     Follow-up and recommended labs and tests    See SW Recommendations for outpatient follow-up appointments. No follow-up labs.     Activity    Your activity upon discharge: activity as tolerated.     Discharge Instructions    Please continue to take your medications as prescribed. Please also practice healthy lifestyle choices, including exercise, healthy diet, stress management, adequate sleep, and cultivating supportive relationships. Please also avoid use of any substances as best as able. Please return to the ED if your symptoms worsen or you do not feel safe.     Diet    Follow this diet upon discharge: Orders Placed This Encounter      Regular Diet Adult           DISCHARGE  SERVICES PROVIDED     45 minutes spent on discharge services, including:  Final examination of patient.  Review and discussion of hospital stay.  Instructions for continued outpatient care/goals.  Preparation of discharge records.  Preparation of medications refills and new prescriptions.  Preparation of applicable referral forms.        ATTESTATION     Dr. Raúl Chicas  Psychiatrist     VIDEO VISIT    Patient has given verbal consent for video visit?: Yes     Video- Visit Details  Type of service:  video visit for mental health treatment.  Time of service:    Date:  02/26/2023    Video Start Time: 730 AM  Video End Time: 800 AM    Reason for video visit: COVID[place]  Originating Site (patient location):  [facility]  Distant Site (provider location):  Remote location  Mode of Communication:  Video Conference via [employer] THIS ADMISSION     Results for orders placed or performed during the hospital encounter of 02/25/23   Magnesium     Status: Abnormal   Result Value Ref [facility]    Magnesium 1.5 (L) 1.7 - 2.3 mg/dL   Phosphorus     Status: Normal   Result Value Ref Range    Phosphorus 3.1 2.5 - 4.5 mg/dL   Symptomatic Influenza A/B & SARS-CoV2 (COVID-19) Virus PCR Multiplex Nasopharyngeal     Status: Normal    Specimen: Nasopharyngeal; Swab   Result Value Ref [facility]    Influenza A PCR Negative Negative    Influenza B PCR Negative Negative    RSV PCR Negative Negative    SARS CoV2 PCR Negative Negative    Narrative    Testing was performed using the Xpert Xpress CoV2/Flu/RSV Assay on the Cepheid GeneXpert Instrument. This test should be ordered for the detection of SARS-CoV-2 and influenza viruses in individuals who meet clinical and/or epidemiological criteria. Test performance is unknown in asymptomatic patients. This test is for in vitro diagnostic use under the FDA EUA for laboratories certified under CLIA to perform high or moderate complexity testing. This test has  not been FDA cleared or approved. A negative result does not rule out the presence of PCR inhibitors in the specimen or target RNA in concentration below the limit of detection for the assay. If only one viral target is positive but coinfection with multiple targets is suspected, the sample should be re-tested with another FDA cleared, approved, or authorized test, if coinfection would change clinical management. This test was validated by the [facility]. These laboratories are certified under the Clinical Laboratory Improvement Amendments of 1988 (CLIA-88) as qualified to perform high complexity laboratory testing.

## 2023-02-27 ENCOUNTER — TELEPHONE (OUTPATIENT)
Dept: BEHAVIORAL HEALTH | Facility: HOSPITAL | Age: 48
End: 2023-02-27

## 2023-02-27 NOTE — TELEPHONE ENCOUNTER
She was discharged to home on  2/26/23 from [facility]. I spoke today with her regarding the patient's discharge.    She indicates she has received sufficient information upon discharge. Medications were reviewed in full on discharge, including: Medications to be started; medications to be stopped; medications to be continued from preadmission and any side effects.   Prescriptions were e-scribed at discharge and were able to be filled. No prescriptions sent. She is requesting that a prescription for Citalopram 20 mg daily be called into [facility] pharmacy.  Message sent to Dr. Chicas regarding her prescription.     Medications are being taken as prescribed.    She did not have any other questions regarding discharge instructions or condition.